# Patient Record
Sex: FEMALE | Race: WHITE | NOT HISPANIC OR LATINO
[De-identification: names, ages, dates, MRNs, and addresses within clinical notes are randomized per-mention and may not be internally consistent; named-entity substitution may affect disease eponyms.]

---

## 2021-11-08 PROBLEM — Z00.00 ENCOUNTER FOR PREVENTIVE HEALTH EXAMINATION: Status: ACTIVE | Noted: 2021-11-08

## 2021-11-09 ENCOUNTER — APPOINTMENT (OUTPATIENT)
Dept: OTOLARYNGOLOGY | Facility: CLINIC | Age: 56
End: 2021-11-09
Payer: SELF-PAY

## 2021-11-09 VITALS
TEMPERATURE: 98.2 F | SYSTOLIC BLOOD PRESSURE: 121 MMHG | HEART RATE: 72 BPM | RESPIRATION RATE: 19 BRPM | WEIGHT: 167 LBS | BODY MASS INDEX: 28.51 KG/M2 | HEIGHT: 64 IN | OXYGEN SATURATION: 96 % | DIASTOLIC BLOOD PRESSURE: 84 MMHG

## 2021-11-09 DIAGNOSIS — H93.12 TINNITUS, LEFT EAR: ICD-10-CM

## 2021-11-09 DIAGNOSIS — H93.19 TINNITUS, UNSPECIFIED EAR: ICD-10-CM

## 2021-11-09 DIAGNOSIS — Z82.49 FAMILY HISTORY OF ISCHEMIC HEART DISEASE AND OTHER DISEASES OF THE CIRCULATORY SYSTEM: ICD-10-CM

## 2021-11-09 DIAGNOSIS — Z78.9 OTHER SPECIFIED HEALTH STATUS: ICD-10-CM

## 2021-11-09 PROCEDURE — 99203 OFFICE O/P NEW LOW 30 MIN: CPT

## 2021-11-09 RX ORDER — VERAPAMIL HYDROCHLORIDE 80 MG/1
TABLET ORAL
Refills: 0 | Status: ACTIVE | COMMUNITY

## 2021-11-09 NOTE — DATA REVIEWED
[de-identified] : brain mri 10.2021 Dr Byrd- t2 signal abnormality, mra brain and neck ok\par 3/21 cspine mri report mild herniations and stenosis all reviewed with pt

## 2021-11-09 NOTE — HISTORY OF PRESENT ILLNESS
[de-identified] : 55 yo F explains she has has h/o herniated disc [id number] and 1 yr ago was exercising and l neck "popped" and she felt dizzy and has vertigo if lays back left or right ever since. SHe also explained left ringing tinnitus started at same time; it can be intense, can be continuous or pulsatile. She also has it on r but very mild. 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 Her left ear rings more loudly if she lies down. no hearing loss No fh or sh relevant to cc.

## 2021-11-09 NOTE — PHYSICAL EXAM
[Midline] : trachea located in midline position [Normal] : no rashes [] : [place] test is negative [de-identified] : gait steady

## 2021-11-09 NOTE — ASSESSMENT
[FreeTextEntry1] : l pulsatile (sometimes) tinnitus and vertigo\par return with  vng vemp, fistula test to try to dx\par she said she was so vertiginous with vng that she screamed in Dr Byrd's office forcing him to curtail it. I explained that vng cannot make you scream and I asked to try her best not to, or we will have to curtail it as well. 
Statement Selected

## 2021-11-22 ENCOUNTER — APPOINTMENT (OUTPATIENT)
Dept: OTOLARYNGOLOGY | Facility: CLINIC | Age: 56
End: 2021-11-22
Payer: COMMERCIAL

## 2021-11-22 ENCOUNTER — APPOINTMENT (OUTPATIENT)
Dept: OTOLARYNGOLOGY | Facility: CLINIC | Age: 56
End: 2021-11-22
Payer: SELF-PAY

## 2021-11-22 VITALS
WEIGHT: 167 LBS | TEMPERATURE: 98.1 F | DIASTOLIC BLOOD PRESSURE: 88 MMHG | BODY MASS INDEX: 28.51 KG/M2 | HEIGHT: 64 IN | SYSTOLIC BLOOD PRESSURE: 122 MMHG | OXYGEN SATURATION: 98 % | HEART RATE: 86 BPM

## 2021-11-22 DIAGNOSIS — H93.A2 PULSATILE TINNITUS, LEFT EAR: ICD-10-CM

## 2021-11-22 DIAGNOSIS — R42 DIZZINESS AND GIDDINESS: ICD-10-CM

## 2021-11-22 PROCEDURE — 92540 BASIC VESTIBULAR EVALUATION: CPT

## 2021-11-22 PROCEDURE — 92550 TYMPANOMETRY & REFLEX THRESH: CPT

## 2021-11-22 PROCEDURE — 92517 VEMP TEST I&R CERVICAL: CPT

## 2021-11-22 PROCEDURE — 92537 CALORIC VSTBLR TEST W/REC: CPT

## 2021-11-22 PROCEDURE — 99213 OFFICE O/P EST LOW 20 MIN: CPT

## 2021-11-22 PROCEDURE — 92557 COMPREHENSIVE HEARING TEST: CPT

## 2021-11-22 PROCEDURE — 92700E: CUSTOM

## 2021-11-22 NOTE — HISTORY OF PRESENT ILLNESS
[de-identified] : followup 55 yo F with pulsatile tinnitus and dizziness. She had mrv, vng and vemp and is here to review results. She feels sx are the same.

## 2021-11-22 NOTE — REASON FOR VISIT
[Subsequent Evaluation] : a subsequent evaluation for [FreeTextEntry2] : pulsatile tinnitus and dizziness

## 2021-11-22 NOTE — ASSESSMENT
[FreeTextEntry1] : explained vemp negative\par mrv negative\par vng not interpretable due to voluntary blinking\par she asked if it could be due to stress as she has had a lot and I explained it could be\par we agreed she would discuss with her pmd for referral to expert\par I explained that I would reorder vng anytime if she wishes.\par reassured no cause of pulsatile tinnitus but has symm hf snhl - discussed options for this and does not want hae but took refs for masker and trt from us \par recheck  1 yr

## 2021-11-22 NOTE — DATA REVIEWED
[de-identified] : vemp negative. vng blinking throughout could not r/o central or peripheral issue.  [de-identified] : mrv unremarkable reviewed with pt

## 2021-12-20 ENCOUNTER — APPOINTMENT (OUTPATIENT)
Dept: PHARMACY | Facility: CLINIC | Age: 56
End: 2021-12-20

## 2022-01-24 ENCOUNTER — APPOINTMENT (OUTPATIENT)
Dept: OTOLARYNGOLOGY | Facility: CLINIC | Age: 57
End: 2022-01-24